# Patient Record
Sex: MALE | Race: WHITE | URBAN - NONMETROPOLITAN AREA
[De-identification: names, ages, dates, MRNs, and addresses within clinical notes are randomized per-mention and may not be internally consistent; named-entity substitution may affect disease eponyms.]

---

## 2018-07-10 ENCOUNTER — OPTICAL OFFICE (OUTPATIENT)
Dept: URBAN - NONMETROPOLITAN AREA CLINIC 4 | Facility: CLINIC | Age: 68
Setting detail: OPHTHALMOLOGY
End: 2018-07-10
Payer: COMMERCIAL

## 2018-07-10 ENCOUNTER — DOCTOR'S OFFICE (OUTPATIENT)
Dept: URBAN - NONMETROPOLITAN AREA CLINIC 1 | Facility: CLINIC | Age: 68
Setting detail: OPHTHALMOLOGY
End: 2018-07-10
Payer: COMMERCIAL

## 2018-07-10 DIAGNOSIS — H52.223: ICD-10-CM

## 2018-07-10 DIAGNOSIS — H52.4: ICD-10-CM

## 2018-07-10 PROCEDURE — V2744 TINT PHOTOCHROMATIC LENS/ES: HCPCS | Performed by: OPTOMETRIST

## 2018-07-10 PROCEDURE — V2025 EYEGLASSES DELUX FRAMES: HCPCS | Performed by: OPTOMETRIST

## 2018-07-10 PROCEDURE — V2750 ANTI-REFLECTIVE COATING: HCPCS | Performed by: OPTOMETRIST

## 2018-07-10 PROCEDURE — V2020 VISION SVCS FRAMES PURCHASES: HCPCS | Performed by: OPTOMETRIST

## 2018-07-10 PROCEDURE — V2103 SPHEROCYLINDR 4.00D/12-2.00D: HCPCS | Performed by: OPTOMETRIST

## 2018-07-10 PROCEDURE — 92004 COMPRE OPH EXAM NEW PT 1/>: CPT | Performed by: OPTOMETRIST

## 2018-07-10 ASSESSMENT — CONFRONTATIONAL VISUAL FIELD TEST (CVF)
OS_FINDINGS: FULL
OD_FINDINGS: FULL

## 2018-07-10 ASSESSMENT — REFRACTION_MANIFEST
OD_VA1: 20/
OD_VA3: 20/
OS_VA2: 20/
OS_VA1: 20/
OS_VA3: 20/
OD_VA3: 20/
OU_VA: 20/
OD_VA2: 20/
OD_VA1: 20/
OS_VA2: 20/
OD_VA2: 20/
OS_VA3: 20/
OU_VA: 20/
OS_VA1: 20/

## 2018-07-10 ASSESSMENT — REFRACTION_CURRENTRX
OS_OVR_VA: 20/
OS_OVR_VA: 20/
OD_OVR_VA: 20/
OD_OVR_VA: 20/
OS_OVR_VA: 20/
OD_OVR_VA: 20/

## 2018-07-10 ASSESSMENT — REFRACTION_OUTSIDERX
OS_CYLINDER: -0.75
OD_SPHERE: +1.00
OS_VA2: 20/25
OD_ADD: +2.25
OD_AXIS: 065
OD_VA3: 20/
OS_VA3: 20/
OS_AXIS: 120
OS_ADD: +2.25
OS_SPHERE: +1.25
OD_VA1: 20/25
OD_VA2: 20/25
OD_CYLINDER: -0.75
OS_VA1: 20/25
OU_VA: 20/

## 2018-07-10 ASSESSMENT — REFRACTION_AUTOREFRACTION
OD_CYLINDER: -1.00
OS_AXIS: 109
OS_CYLINDER: -0.75
OD_SPHERE: +1.25
OD_AXIS: 066
OS_SPHERE: +1.25

## 2018-07-10 ASSESSMENT — VISUAL ACUITY
OD_BCVA: 20/40-1
OS_BCVA: 20/30

## 2018-07-10 ASSESSMENT — SPHEQUIV_DERIVED
OS_SPHEQUIV: 0.875
OD_SPHEQUIV: 0.75

## 2019-12-27 ENCOUNTER — RX ONLY (RX ONLY)
Age: 69
End: 2019-12-27

## 2019-12-27 ENCOUNTER — DOCTOR'S OFFICE (OUTPATIENT)
Dept: URBAN - NONMETROPOLITAN AREA CLINIC 1 | Facility: CLINIC | Age: 69
Setting detail: OPHTHALMOLOGY
End: 2019-12-27
Payer: COMMERCIAL

## 2019-12-27 DIAGNOSIS — H04.223: ICD-10-CM

## 2019-12-27 DIAGNOSIS — D31.01: ICD-10-CM

## 2019-12-27 DIAGNOSIS — D31.42: ICD-10-CM

## 2019-12-27 DIAGNOSIS — H25.13: ICD-10-CM

## 2019-12-27 DIAGNOSIS — H11.443: ICD-10-CM

## 2019-12-27 PROCEDURE — 92012 INTRM OPH EXAM EST PATIENT: CPT | Performed by: OPHTHALMOLOGY

## 2019-12-27 ASSESSMENT — REFRACTION_AUTOREFRACTION
OS_SPHERE: +1.75
OS_AXIS: 108
OS_CYLINDER: -0.75
OD_CYLINDER: -1.00
OD_SPHERE: +1.50
OD_AXIS: 075

## 2019-12-27 ASSESSMENT — REFRACTION_MANIFEST
OD_CYLINDER: -0.75
OD_VA1: 20/25
OS_ADD: +2.25
OS_VA1: 20/25
OS_VA3: 20/
OD_AXIS: 065
OS_AXIS: 120
OS_CYLINDER: -0.75
OD_ADD: +2.25
OD_VA2: 20/25
OU_VA: 20/
OD_VA3: 20/
OS_VA2: 20/25
OD_SPHERE: +1.00
OS_SPHERE: +1.25

## 2019-12-27 ASSESSMENT — INCREASING TEAR LAKE - SEVERITY SCORE
OD_INC_TEARLAKE: ABSENT
OS_INC_TEARLAKE: ABSENT

## 2019-12-27 ASSESSMENT — SPHEQUIV_DERIVED
OS_SPHEQUIV: 0.875
OD_SPHEQUIV: 1
OD_SPHEQUIV: 0.625
OS_SPHEQUIV: 1.375

## 2019-12-27 ASSESSMENT — VISUAL ACUITY
OS_BCVA: 20/50-1
OD_BCVA: 20/50-1

## 2019-12-27 ASSESSMENT — CONFRONTATIONAL VISUAL FIELD TEST (CVF)
OS_FINDINGS: FULL
OD_FINDINGS: FULL

## 2019-12-27 ASSESSMENT — SUPERFICIAL PUNCTATE KERATITIS (SPK)
OS_SPK: ABSENT
OD_SPK: ABSENT

## 2020-01-28 ENCOUNTER — AMBUL SURGICAL CARE (OUTPATIENT)
Dept: URBAN - NONMETROPOLITAN AREA SURGERY 1 | Facility: SURGERY | Age: 70
Setting detail: OPHTHALMOLOGY
End: 2020-01-28
Payer: COMMERCIAL

## 2020-01-28 DIAGNOSIS — H04.222: ICD-10-CM

## 2020-01-28 DIAGNOSIS — H04.221: ICD-10-CM

## 2020-01-28 DIAGNOSIS — H04.223: ICD-10-CM

## 2020-01-28 PROCEDURE — 68840 EXPLORE/IRRIGATE TEAR DUCTS: CPT | Performed by: OPHTHALMOLOGY

## 2020-01-28 PROCEDURE — G8918 PT W/O PREOP ORDER IV AB PRO: HCPCS | Performed by: OPHTHALMOLOGY

## 2020-01-28 PROCEDURE — G8907 PT DOC NO EVENTS ON DISCHARG: HCPCS | Performed by: OPHTHALMOLOGY

## 2020-02-04 ENCOUNTER — RX ONLY (RX ONLY)
Age: 70
End: 2020-02-04

## 2020-02-04 ENCOUNTER — DOCTOR'S OFFICE (OUTPATIENT)
Dept: URBAN - NONMETROPOLITAN AREA CLINIC 1 | Facility: CLINIC | Age: 70
Setting detail: OPHTHALMOLOGY
End: 2020-02-04
Payer: COMMERCIAL

## 2020-02-04 DIAGNOSIS — H02.89: ICD-10-CM

## 2020-02-04 DIAGNOSIS — H02.101: ICD-10-CM

## 2020-02-04 DIAGNOSIS — H04.223: ICD-10-CM

## 2020-02-04 DIAGNOSIS — D31.01: ICD-10-CM

## 2020-02-04 DIAGNOSIS — H02.104: ICD-10-CM

## 2020-02-04 PROCEDURE — 99024 POSTOP FOLLOW-UP VISIT: CPT | Performed by: OPHTHALMOLOGY

## 2020-02-04 PROCEDURE — 92285 EXTERNAL OCULAR PHOTOGRAPHY: CPT | Performed by: OPHTHALMOLOGY

## 2020-02-04 ASSESSMENT — REFRACTION_MANIFEST
OS_SPHERE: +1.25
OD_AXIS: 065
OD_VA3: 20/
OD_ADD: +2.25
OD_VA1: 20/25
OD_SPHERE: +1.00
OD_VA2: 20/25
OS_VA2: 20/25
OS_ADD: +2.25
OS_CYLINDER: -0.75
OS_AXIS: 120
OS_VA1: 20/25
OU_VA: 20/
OS_VA3: 20/
OD_CYLINDER: -0.75

## 2020-02-04 ASSESSMENT — REFRACTION_AUTOREFRACTION
OS_SPHERE: +1.75
OD_CYLINDER: -1.00
OD_SPHERE: +1.50
OD_AXIS: 765
OS_CYLINDER: -0.75
OS_AXIS: 111

## 2020-02-04 ASSESSMENT — SUPERFICIAL PUNCTATE KERATITIS (SPK)
OD_SPK: ABSENT
OS_SPK: ABSENT

## 2020-02-04 ASSESSMENT — KERATOMETRY
OS_K2POWER_DIOPTERS: 42.00
OD_AXISANGLE_DEGREES: 002
OS_AXISANGLE_DEGREES: 007
OD_K2POWER_DIOPTERS: 41.50
OD_K1POWER_DIOPTERS: 41.25
OS_K1POWER_DIOPTERS: 41.75

## 2020-02-04 ASSESSMENT — CONFRONTATIONAL VISUAL FIELD TEST (CVF)
OS_FINDINGS: FULL
OD_FINDINGS: FULL

## 2020-02-04 ASSESSMENT — LID POSITION - COMMENTS
OD_COMMENTS: FLOPPY LIDS
OS_COMMENTS: FLOPPY LIDS

## 2020-02-04 ASSESSMENT — SPHEQUIV_DERIVED
OD_SPHEQUIV: 0.625
OD_SPHEQUIV: 1
OS_SPHEQUIV: 0.875
OS_SPHEQUIV: 1.375

## 2020-02-04 ASSESSMENT — AXIALLENGTH_DERIVED
OS_AL: 23.8507
OD_AL: 24.1414
OS_AL: 23.6532
OD_AL: 23.9894

## 2020-02-04 ASSESSMENT — INCREASING TEAR LAKE - SEVERITY SCORE
OS_INC_TEARLAKE: ABSENT
OD_INC_TEARLAKE: ABSENT

## 2020-02-04 ASSESSMENT — VISUAL ACUITY
OD_BCVA: 20/40-2
OS_BCVA: 20/50+2

## 2020-02-04 ASSESSMENT — LID POSITION - ECTROPION: OS_ECTROPION: LUL 1+

## 2021-03-30 ENCOUNTER — DOCTOR'S OFFICE (OUTPATIENT)
Dept: URBAN - NONMETROPOLITAN AREA CLINIC 1 | Facility: CLINIC | Age: 71
Setting detail: OPHTHALMOLOGY
End: 2021-03-30
Payer: COMMERCIAL

## 2021-03-30 DIAGNOSIS — H52.4: ICD-10-CM

## 2021-03-30 PROBLEM — H02.101: Status: ACTIVE | Noted: 2020-02-04

## 2021-03-30 PROBLEM — H11.443 CONJUNCTIVAL CYSTS; BOTH EYES: Status: ACTIVE | Noted: 2019-12-27

## 2021-03-30 PROBLEM — H02.104: Status: ACTIVE | Noted: 2020-02-04

## 2021-03-30 PROBLEM — D31.42 PINGUECULA; BOTH EYES: Status: ACTIVE | Noted: 2019-12-27

## 2021-03-30 PROBLEM — H02.89 FLOPPY LID SYNDROME ; BOTH EYES: Status: ACTIVE | Noted: 2020-02-04

## 2021-03-30 PROBLEM — D31.01: Status: ACTIVE | Noted: 2018-07-10

## 2021-03-30 PROBLEM — D31.01 BENIGN NEOPLASM OF RIGHT CONJUNCTIVA: Status: ACTIVE | Noted: 2020-02-04

## 2021-03-30 PROBLEM — H04.223 EPIPHORA D/T INSUFFICIENT DRAINAGE; BOTH EYES: Status: RESOLVED | Noted: 2019-12-27 | Resolved: 2021-03-30

## 2021-03-30 PROBLEM — H25.13 NUCLEAR SCLEROSIS; BOTH EYES: Status: ACTIVE | Noted: 2018-07-10

## 2021-03-30 PROCEDURE — 92014 COMPRE OPH EXAM EST PT 1/>: CPT | Performed by: OPTOMETRIST

## 2021-03-30 ASSESSMENT — KERATOMETRY
OS_K2POWER_DIOPTERS: 42.00
OD_K2POWER_DIOPTERS: 41.50
OS_AXISANGLE_DEGREES: 007
OD_K1POWER_DIOPTERS: 41.25
OD_AXISANGLE_DEGREES: 002
OS_K1POWER_DIOPTERS: 41.75

## 2021-03-30 ASSESSMENT — REFRACTION_MANIFEST
OS_VA2: 20/30
OD_CYLINDER: -0.75
OD_ADD: +2.50
OS_CYLINDER: -0.75
OS_VA1: 20/30
OD_AXIS: 065
OS_SPHERE: +1.25
OS_AXIS: 120
OD_VA1: 20/25
OS_ADD: +2.50
OD_SPHERE: +1.50
OD_VA2: 20/25

## 2021-03-30 ASSESSMENT — VISUAL ACUITY
OS_BCVA: 20/30-2
OD_BCVA: 20/40

## 2021-03-30 ASSESSMENT — SUPERFICIAL PUNCTATE KERATITIS (SPK)
OD_SPK: ABSENT
OS_SPK: ABSENT

## 2021-03-30 ASSESSMENT — LID POSITION - COMMENTS
OD_COMMENTS: FLOPPY LIDS
OS_COMMENTS: FLOPPY LIDS

## 2021-03-30 ASSESSMENT — REFRACTION_AUTOREFRACTION
OD_CYLINDER: -1.00
OD_SPHERE: +1.75
OD_AXIS: 62
OS_SPHERE: +2.00
OS_CYLINDER: SPH

## 2021-03-30 ASSESSMENT — AXIALLENGTH_DERIVED
OD_AL: 23.9392
OS_AL: 23.8507
OD_AL: 23.8891

## 2021-03-30 ASSESSMENT — CONFRONTATIONAL VISUAL FIELD TEST (CVF)
OD_FINDINGS: FULL
OS_FINDINGS: FULL

## 2021-03-30 ASSESSMENT — TONOMETRY
OS_IOP_MMHG: 19
OD_IOP_MMHG: 20

## 2021-03-30 ASSESSMENT — INCREASING TEAR LAKE - SEVERITY SCORE
OS_INC_TEARLAKE: ABSENT
OD_INC_TEARLAKE: ABSENT

## 2021-03-30 ASSESSMENT — LID POSITION - ECTROPION: OS_ECTROPION: LUL 1+

## 2021-03-30 ASSESSMENT — SPHEQUIV_DERIVED
OD_SPHEQUIV: 1.125
OS_SPHEQUIV: 0.875
OD_SPHEQUIV: 1.25

## 2021-03-31 ENCOUNTER — OPTICAL OFFICE (OUTPATIENT)
Dept: URBAN - NONMETROPOLITAN AREA CLINIC 4 | Facility: CLINIC | Age: 71
Setting detail: OPHTHALMOLOGY
End: 2021-03-31
Payer: COMMERCIAL

## 2021-03-31 DIAGNOSIS — H52.223: ICD-10-CM

## 2021-03-31 PROCEDURE — V2781 PROGRESSIVE LENS PER LENS: HCPCS | Performed by: OPTOMETRIST

## 2021-03-31 PROCEDURE — V2744 TINT PHOTOCHROMATIC LENS/ES: HCPCS | Performed by: OPTOMETRIST

## 2021-03-31 PROCEDURE — V2025 EYEGLASSES DELUX FRAMES: HCPCS | Performed by: OPTOMETRIST

## 2021-03-31 PROCEDURE — V2750 ANTI-REFLECTIVE COATING: HCPCS | Performed by: OPTOMETRIST

## 2021-03-31 PROCEDURE — V2203 LENS SPHCYL BIFOCAL 4.00D/.1: HCPCS | Performed by: OPTOMETRIST

## 2021-03-31 PROCEDURE — V2020 VISION SVCS FRAMES PURCHASES: HCPCS | Performed by: OPTOMETRIST

## 2021-04-08 DIAGNOSIS — Z23 ENCOUNTER FOR IMMUNIZATION: ICD-10-CM

## 2023-08-24 ENCOUNTER — DOCTOR'S OFFICE (OUTPATIENT)
Dept: URBAN - NONMETROPOLITAN AREA CLINIC 1 | Facility: CLINIC | Age: 73
Setting detail: OPHTHALMOLOGY
End: 2023-08-24
Payer: COMMERCIAL

## 2023-08-24 ENCOUNTER — OPTICAL OFFICE (OUTPATIENT)
Dept: URBAN - NONMETROPOLITAN AREA CLINIC 4 | Facility: CLINIC | Age: 73
Setting detail: OPHTHALMOLOGY
End: 2023-08-24
Payer: COMMERCIAL

## 2023-08-24 VITALS — HEIGHT: 60 IN

## 2023-08-24 DIAGNOSIS — H52.4: ICD-10-CM

## 2023-08-24 DIAGNOSIS — H52.223: ICD-10-CM

## 2023-08-24 DIAGNOSIS — H52.03: ICD-10-CM

## 2023-08-24 PROBLEM — H25.13 CATARACT NUCLEAR SCLEROSIS; BOTH EYES: Status: ACTIVE | Noted: 2023-08-24

## 2023-08-24 PROCEDURE — V2020 VISION SVCS FRAMES PURCHASES: HCPCS

## 2023-08-24 PROCEDURE — V2784 LENS POLYCARB OR EQUAL: HCPCS

## 2023-08-24 PROCEDURE — 92014 COMPRE OPH EXAM EST PT 1/>: CPT

## 2023-08-24 PROCEDURE — V2744 TINT PHOTOCHROMATIC LENS/ES: HCPCS

## 2023-08-24 PROCEDURE — V2103 SPHEROCYLINDR 4.00D/12-2.00D: HCPCS

## 2023-08-24 PROCEDURE — 92015 DETERMINE REFRACTIVE STATE: CPT

## 2023-08-24 PROCEDURE — V2750 ANTI-REFLECTIVE COATING: HCPCS

## 2023-08-24 ASSESSMENT — REFRACTION_CURRENTRX
OS_ADD: +2.25
OS_VPRISM_DIRECTION: PROGS
OD_SPHERE: +1.00
OD_VPRISM_DIRECTION: SV
OS_OVR_VA: 20/
OS_CYLINDER: -1.00
OD_SPHERE: +1.75
OD_AXIS: 067
OD_OVR_VA: 20/
OD_OVR_VA: 20/
OD_CYLINDER: -0.75
OS_SPHERE: +1.25
OD_ADD: +2.25
OS_VPRISM_DIRECTION: SV
OS_AXIS: 112
OS_AXIS: 116
OD_AXIS: 074
OS_SPHERE: +1.25
OD_CYLINDER: -0.50
OD_VPRISM_DIRECTION: PROGS
OS_OVR_VA: 20/
OS_CYLINDER: -0.75

## 2023-08-24 ASSESSMENT — REFRACTION_AUTOREFRACTION
OD_CYLINDER: -1.00
OS_CYLINDER: -1.00
OD_AXIS: 071
OS_AXIS: 111
OD_SPHERE: +2.00
OS_SPHERE: +2.25

## 2023-08-24 ASSESSMENT — CONFRONTATIONAL VISUAL FIELD TEST (CVF)
OS_FINDINGS: FULL
OD_FINDINGS: FULL

## 2023-08-24 ASSESSMENT — AXIALLENGTH_DERIVED
OS_AL: 23.7515
OD_AL: 23.7897
OS_AL: 23.5073
OD_AL: 24.0399

## 2023-08-24 ASSESSMENT — REFRACTION_MANIFEST
OD_ADD: +2.50
OS_VA2: 20/20
OS_SPHERE: +1.50
OD_CYLINDER: -0.75
OS_AXIS: 110
OD_VA1: 20/25-
OD_SPHERE: +1.25
OS_VA1: 20/30-2
OS_ADD: +2.50
OD_AXIS: 050
OD_VA2: 20/20
OS_CYLINDER: -0.75
OU_VA: 20/25

## 2023-08-24 ASSESSMENT — VISUAL ACUITY
OD_BCVA: 20/40-2
OS_BCVA: 20/30+1

## 2023-08-24 ASSESSMENT — LID POSITION - ECTROPION: OS_ECTROPION: LUL 1+

## 2023-08-24 ASSESSMENT — LID POSITION - COMMENTS
OD_COMMENTS: FLOPPY LIDS
OS_COMMENTS: FLOPPY LIDS

## 2023-08-24 ASSESSMENT — TONOMETRY
OD_IOP_MMHG: 20
OS_IOP_MMHG: 20

## 2023-08-24 ASSESSMENT — SPHEQUIV_DERIVED
OD_SPHEQUIV: 0.875
OS_SPHEQUIV: 1.125
OS_SPHEQUIV: 1.75
OD_SPHEQUIV: 1.5

## 2023-08-24 ASSESSMENT — KERATOMETRY
OD_K2POWER_DIOPTERS: 41.50
OS_K2POWER_DIOPTERS: 42.00
OS_AXISANGLE_DEGREES: 007
OS_K1POWER_DIOPTERS: 41.75
OD_K1POWER_DIOPTERS: 41.25
OD_AXISANGLE_DEGREES: 002

## 2024-08-31 ENCOUNTER — OFFICE VISIT (OUTPATIENT)
Dept: URGENT CARE | Facility: CLINIC | Age: 74
End: 2024-08-31
Payer: COMMERCIAL

## 2024-08-31 ENCOUNTER — APPOINTMENT (OUTPATIENT)
Dept: RADIOLOGY | Facility: CLINIC | Age: 74
End: 2024-08-31
Payer: COMMERCIAL

## 2024-08-31 VITALS
HEIGHT: 71 IN | TEMPERATURE: 97.5 F | BODY MASS INDEX: 28 KG/M2 | OXYGEN SATURATION: 98 % | RESPIRATION RATE: 16 BRPM | WEIGHT: 200 LBS | HEART RATE: 81 BPM | SYSTOLIC BLOOD PRESSURE: 130 MMHG | DIASTOLIC BLOOD PRESSURE: 70 MMHG

## 2024-08-31 DIAGNOSIS — M25.511 ACUTE PAIN OF RIGHT SHOULDER: ICD-10-CM

## 2024-08-31 DIAGNOSIS — M89.8X1 SHOULDER BLADE PAIN: Primary | ICD-10-CM

## 2024-08-31 PROCEDURE — S9083 URGENT CARE CENTER GLOBAL: HCPCS

## 2024-08-31 PROCEDURE — 73010 X-RAY EXAM OF SHOULDER BLADE: CPT

## 2024-08-31 PROCEDURE — G0382 LEV 3 HOSP TYPE B ED VISIT: HCPCS

## 2024-08-31 RX ORDER — ENALAPRIL MALEATE 10 MG/1
TABLET ORAL
COMMUNITY
Start: 2024-07-28

## 2024-08-31 RX ORDER — ATORVASTATIN CALCIUM 20 MG/1
TABLET, FILM COATED ORAL
COMMUNITY
Start: 2024-08-23

## 2024-08-31 RX ORDER — AMLODIPINE BESYLATE 10 MG/1
TABLET ORAL
COMMUNITY
Start: 2024-08-23

## 2024-08-31 RX ORDER — HYDROCHLOROTHIAZIDE 25 MG/1
TABLET ORAL
COMMUNITY
Start: 2024-07-28

## 2024-08-31 NOTE — PROGRESS NOTES
[facility]        NAME: Bhavesh Busch is a 74 y.o. male  : 1950    MRN: [id number]  DATE: 2024  TIME: 11:00 AM    Assessment and Plan   Shoulder blade pain [M89.8X1]  1. Shoulder blade pain  Ambulatory Referral to Orthopedic Surgery      2. Acute pain of right shoulder  XR scapula right        Preliminary xray read by myself. No acute osseous abnormality noted. Pending radiologist final read.      Patient Instructions     Tylenol as needed  Ice as needed  Rest and light stretching  Follow up with ortho if not improving  Follow up with PCP in 3-5 days.  Proceed to  ER if symptoms worsen.    If tests are performed, our office will contact you with results only if changes need to made to the care plan discussed with you at the visit. [facility]    Chief Complaint     Chief Complaint   Patient presents with    Shoulder Pain     Right shoulder discomfort since working on his truck X 7 days. Pain radiating down arm. Took advil         History of Present Illness       Shoulder Pain   The pain is present in the right shoulder (radiates down R arm). This is a new problem. Episode onset: 7 days. There has been a history of trauma (working on his truck). Pertinent negatives include no limited range of motion, numbness, stiffness or tingling. Treatments tried: Advil.       Review of Systems   Review of Systems   Constitutional: Negative.    Respiratory: Negative.     Cardiovascular: Negative.    Musculoskeletal:  Positive for arthralgias (R shoulder). Negative for stiffness.   Skin: Negative.    Neurological:  Negative for tingling and numbness.         Current Medications       Current Outpatient Medications:     amLODIPine (NORVASC) 10 mg tablet, , Disp: , Rfl:     atorvastatin (LIPITOR) 20 mg tablet, , Disp: , Rfl:     enalapril (VASOTEC) 10 mg tablet, , Disp: , Rfl:     hydroCHLOROthiazide 25 mg tablet, , Disp: , Rfl:     Current Allergies  "    Allergies as of 08/31/2024    (No Known Allergies)            The following portions of the patient's history were reviewed and updated as appropriate: allergies, current medications, past family history, past medical history, past social history, past surgical history and problem list.     Past Medical History:   Diagnosis Date    Cancer (HCC)     Hyperlipemia     Hypertension        Past Surgical History:   Procedure Laterality Date    COLOSTOMY      HERNIA REPAIR         History reviewed. No pertinent family history.      Medications have been verified.        Objective   /70   Pulse 81   Temp 97.5 °F (36.4 °C)   Resp 16   Ht 5' 11\" (1.803 m)   Wt 90.7 kg (200 lb)   SpO2 98%   BMI 27.89 kg/m²        Physical Exam     Physical Exam  Constitutional:       Appearance: Normal appearance.   Cardiovascular:      Rate and Rhythm: Normal rate.      Pulses: Normal pulses.   Pulmonary:      Effort: Pulmonary effort is normal.   Musculoskeletal:         General: Swelling (R shoulder blade) present. No tenderness.      Comments: Full ROM of R shoulder. Negative Hawkin.  No cervical or thoracic bony tenderness.  No clavicle tenderness.  No AC joint tenderness.    Skin:     General: Skin is warm and dry.   Neurological:      General: No focal deficit present.      Mental Status: He is alert and oriented to person, place, and time. Mental status is at baseline.                   "

## 2024-08-31 NOTE — PATIENT INSTRUCTIONS
Tylenol as needed  Ice as needed  Rest and light stretching  Follow up with ortho if not improving  Follow up with PCP in 3-5 days.  Proceed to  ER if symptoms worsen.    If tests are performed, our office will contact you with results only if changes need to made to the care plan discussed with you at the visit. You can review your full results on [facility].